# Patient Record
Sex: FEMALE | Race: BLACK OR AFRICAN AMERICAN | NOT HISPANIC OR LATINO | URBAN - METROPOLITAN AREA
[De-identification: names, ages, dates, MRNs, and addresses within clinical notes are randomized per-mention and may not be internally consistent; named-entity substitution may affect disease eponyms.]

---

## 2024-03-14 ENCOUNTER — OV NP (OUTPATIENT)
Dept: URBAN - METROPOLITAN AREA CLINIC 118 | Facility: CLINIC | Age: 54
End: 2024-03-14
Payer: COMMERCIAL

## 2024-03-14 VITALS
HEART RATE: 73 BPM | TEMPERATURE: 97.7 F | BODY MASS INDEX: 21.28 KG/M2 | SYSTOLIC BLOOD PRESSURE: 158 MMHG | WEIGHT: 135.6 LBS | DIASTOLIC BLOOD PRESSURE: 86 MMHG | HEIGHT: 67 IN

## 2024-03-14 DIAGNOSIS — Z12.11 COLON CANCER SCREENING: ICD-10-CM

## 2024-03-14 PROBLEM — 305058001: Status: ACTIVE | Noted: 2024-03-14

## 2024-03-14 PROCEDURE — 99204 OFFICE O/P NEW MOD 45 MIN: CPT | Performed by: STUDENT IN AN ORGANIZED HEALTH CARE EDUCATION/TRAINING PROGRAM

## 2024-03-14 RX ORDER — ALBUTEROL SULFATE 1.25 MG/3ML
SOLUTION RESPIRATORY (INHALATION)
Qty: 75 MILLILITER | Refills: 0 | Status: ACTIVE | COMMUNITY

## 2024-03-14 RX ORDER — ALBUTEROL SULFATE 108 UG/1
INHALANT RESPIRATORY (INHALATION)
Qty: 6.7 GRAM | Refills: 3 | Status: ACTIVE | COMMUNITY

## 2024-03-14 RX ORDER — POLYETHYLENE GLYCOL-3350 AND ELECTROLYTES 236; 6.74; 5.86; 2.97; 22.74 G/274.31G; G/274.31G; G/274.31G; G/274.31G; G/274.31G
AS DIRECTED POWDER, FOR SOLUTION ORAL
Qty: 1 KIT | Refills: 0 | OUTPATIENT
Start: 2024-03-14 | End: 2024-03-15

## 2024-03-14 RX ORDER — FLUTICASONE PROPIONATE AND SALMETEROL XINAFOATE 230; 21 UG/1; UG/1
AEROSOL, METERED RESPIRATORY (INHALATION)
Qty: 12 GRAM | Refills: 2 | Status: ACTIVE | COMMUNITY

## 2024-03-14 NOTE — HPI-TODAY'S VISIT:
The patient is a 54-year-old female with history of controlled asthma (on daily Advair) who presents for screening colonoscopy.  The patient reports that she has longstanding constipation and last year had a CT abdomen and pelvis ordered for abdominal pain which revealed constipation.  She has tried many laxatives in the past but is interested in proceeding with a colonoscopy given her severe constipation and need for screening.  She has never had a colonoscopy before but had a negative fit test twice in the past.  Patient denies family history of colon cancer, but notes that her mother was recently diagnosed with a gastric tumor (details unclear).  The patient denies diarrhea, abdominal pain, blood in her stool, nausea, vomiting, or changes in her weight.  She states that she has a bowel movement daily with the use of supplements, with some straining.

## 2024-03-15 LAB
ABSOLUTE BASOPHILS: 42
ABSOLUTE EOSINOPHILS: 161
ABSOLUTE LYMPHOCYTES: 1253
ABSOLUTE MONOCYTES: 364
ABSOLUTE NEUTROPHILS: 1680
BASOPHILS: 1.2
EOSINOPHILS: 4.6
FERRITIN, SERUM: 32
HEMATOCRIT: 43.9
HEMOGLOBIN: 14.1
IRON BIND.CAP.(TIBC): 381
IRON SATURATION: 27
IRON: 101
LYMPHOCYTES: 35.8
MCH: 27.8
MCHC: 32.1
MCV: 86.6
MONOCYTES: 10.4
MPV: 10
NEUTROPHILS: 48
PLATELET COUNT: 345
RDW: 13.4
RED BLOOD CELL COUNT: 5.07
WHITE BLOOD CELL COUNT: 3.5

## 2024-03-21 ENCOUNTER — LAB (OUTPATIENT)
Dept: URBAN - METROPOLITAN AREA CLINIC 118 | Facility: CLINIC | Age: 54
End: 2024-03-21

## 2024-04-18 ENCOUNTER — COLON (OUTPATIENT)
Dept: URBAN - METROPOLITAN AREA SURGERY CENTER 23 | Facility: SURGERY CENTER | Age: 54
End: 2024-04-18